# Patient Record
Sex: FEMALE | Race: WHITE | NOT HISPANIC OR LATINO
[De-identification: names, ages, dates, MRNs, and addresses within clinical notes are randomized per-mention and may not be internally consistent; named-entity substitution may affect disease eponyms.]

---

## 2017-06-15 ENCOUNTER — RX RENEWAL (OUTPATIENT)
Age: 27
End: 2017-06-15

## 2017-11-30 ENCOUNTER — RX RENEWAL (OUTPATIENT)
Age: 27
End: 2017-11-30

## 2023-03-31 ENCOUNTER — OUTPATIENT (OUTPATIENT)
Dept: OUTPATIENT SERVICES | Facility: HOSPITAL | Age: 33
LOS: 1 days | End: 2023-03-31
Payer: COMMERCIAL

## 2023-03-31 VITALS
TEMPERATURE: 98 F | DIASTOLIC BLOOD PRESSURE: 72 MMHG | WEIGHT: 145.95 LBS | HEIGHT: 65 IN | OXYGEN SATURATION: 98 % | HEART RATE: 84 BPM | RESPIRATION RATE: 16 BRPM | SYSTOLIC BLOOD PRESSURE: 110 MMHG

## 2023-03-31 DIAGNOSIS — Z01.818 ENCOUNTER FOR OTHER PREPROCEDURAL EXAMINATION: ICD-10-CM

## 2023-03-31 DIAGNOSIS — J34.89 OTHER SPECIFIED DISORDERS OF NOSE AND NASAL SINUSES: ICD-10-CM

## 2023-03-31 DIAGNOSIS — J34.2 DEVIATED NASAL SEPTUM: ICD-10-CM

## 2023-03-31 DIAGNOSIS — K08.409 PARTIAL LOSS OF TEETH, UNSPECIFIED CAUSE, UNSPECIFIED CLASS: Chronic | ICD-10-CM

## 2023-03-31 DIAGNOSIS — J34.3 HYPERTROPHY OF NASAL TURBINATES: ICD-10-CM

## 2023-03-31 DIAGNOSIS — J98.8 OTHER SPECIFIED RESPIRATORY DISORDERS: ICD-10-CM

## 2023-03-31 LAB
HCG SERPL-ACNC: <1 MIU/ML — SIGNIFICANT CHANGE UP
HCT VFR BLD CALC: 39.1 % — SIGNIFICANT CHANGE UP (ref 34.5–45)
HGB BLD-MCNC: 13 G/DL — SIGNIFICANT CHANGE UP (ref 11.5–15.5)
MCHC RBC-ENTMCNC: 30.8 PG — SIGNIFICANT CHANGE UP (ref 27–34)
MCHC RBC-ENTMCNC: 33.2 GM/DL — SIGNIFICANT CHANGE UP (ref 32–36)
MCV RBC AUTO: 92.7 FL — SIGNIFICANT CHANGE UP (ref 80–100)
NRBC # BLD: 0 /100 WBCS — SIGNIFICANT CHANGE UP (ref 0–0)
PLATELET # BLD AUTO: 228 K/UL — SIGNIFICANT CHANGE UP (ref 150–400)
RBC # BLD: 4.22 M/UL — SIGNIFICANT CHANGE UP (ref 3.8–5.2)
RBC # FLD: 12.6 % — SIGNIFICANT CHANGE UP (ref 10.3–14.5)
WBC # BLD: 6.25 K/UL — SIGNIFICANT CHANGE UP (ref 3.8–10.5)
WBC # FLD AUTO: 6.25 K/UL — SIGNIFICANT CHANGE UP (ref 3.8–10.5)

## 2023-03-31 PROCEDURE — 85027 COMPLETE CBC AUTOMATED: CPT

## 2023-03-31 PROCEDURE — G0463: CPT

## 2023-03-31 PROCEDURE — 36415 COLL VENOUS BLD VENIPUNCTURE: CPT

## 2023-03-31 PROCEDURE — 84702 CHORIONIC GONADOTROPIN TEST: CPT

## 2023-03-31 NOTE — H&P PST ADULT - PROBLEM SELECTOR PLAN 3
Labs CBC & HCG   No Medical clearance necessary  Pre op instructions reviewed and given.   Take routine am meds with sip of water.  Instructed to hold and/or avoid other NSAIDs and OTC supplements. Tylenol is ok. Verbalized understanding

## 2023-03-31 NOTE — H&P PST ADULT - HISTORY OF PRESENT ILLNESS
33 y/o female with PMH HLD, Anxiety, & Deviated Septum for PST having Septoplasty-Bilateral Turbinoplasty- Repair - Nasal Vestibular Stenosis- Kiana Bullosa Resection by Dr. Cardona on 4/4/2023. Reports having post nasal drip and  difficulty breathing through both nostrils which is worse when trying to exercise.

## 2023-03-31 NOTE — H&P PST ADULT - NSICDXFAMILYHX_GEN_ALL_CORE_FT
FAMILY HISTORY:  Father  Still living? Yes, Estimated age: Age Unknown  Family history of dyslipidemia, Age at diagnosis: Age Unknown  FH: hypertension, Age at diagnosis: Age Unknown    Mother  Still living? Unknown  Family history of dyslipidemia, Age at diagnosis: Age Unknown  FH: hypertension, Age at diagnosis: Age Unknown    Grandparent  Still living? No  Family history of stroke, Age at diagnosis: Age Unknown  FH: lung cancer, Age at diagnosis: Age Unknown  FH: myocardial infarction, Age at diagnosis: Age Unknown  FH: type 2 diabetes, Age at diagnosis: Age Unknown  FHx: melanoma, Age at diagnosis: Age Unknown    Uncle  Still living? Unknown  FH: pancreatic cancer, Age at diagnosis: Age Unknown

## 2023-04-02 RX ORDER — SODIUM CHLORIDE 9 MG/ML
1000 INJECTION, SOLUTION INTRAVENOUS
Refills: 0 | Status: DISCONTINUED | OUTPATIENT
Start: 2023-04-04 | End: 2023-04-18

## 2023-04-03 ENCOUNTER — TRANSCRIPTION ENCOUNTER (OUTPATIENT)
Age: 33
End: 2023-04-03

## 2023-04-03 NOTE — ASU PATIENT PROFILE, ADULT - FALL HARM RISK - UNIVERSAL INTERVENTIONS
Bed in lowest position, wheels locked, appropriate side rails in place/Call bell, personal items and telephone in reach/Instruct patient to call for assistance before getting out of bed or chair/Non-slip footwear when patient is out of bed/[place] to call system/Physically safe environment - no spills, clutter or unnecessary equipment/Purposeful Proactive Rounding/Room/bathroom lighting operational, light cord in reach

## 2023-04-04 ENCOUNTER — TRANSCRIPTION ENCOUNTER (OUTPATIENT)
Age: 33
End: 2023-04-04

## 2023-04-04 ENCOUNTER — OUTPATIENT (OUTPATIENT)
Dept: OUTPATIENT SERVICES | Facility: HOSPITAL | Age: 33
LOS: 1 days | End: 2023-04-04
Payer: COMMERCIAL

## 2023-04-04 VITALS
OXYGEN SATURATION: 98 % | SYSTOLIC BLOOD PRESSURE: 104 MMHG | RESPIRATION RATE: 15 BRPM | DIASTOLIC BLOOD PRESSURE: 78 MMHG | HEIGHT: 65 IN | WEIGHT: 145.95 LBS | TEMPERATURE: 98 F | HEART RATE: 64 BPM

## 2023-04-04 VITALS
RESPIRATION RATE: 13 BRPM | TEMPERATURE: 99 F | DIASTOLIC BLOOD PRESSURE: 71 MMHG | HEART RATE: 62 BPM | SYSTOLIC BLOOD PRESSURE: 112 MMHG | OXYGEN SATURATION: 99 %

## 2023-04-04 DIAGNOSIS — Z01.818 ENCOUNTER FOR OTHER PREPROCEDURAL EXAMINATION: ICD-10-CM

## 2023-04-04 DIAGNOSIS — J34.3 HYPERTROPHY OF NASAL TURBINATES: ICD-10-CM

## 2023-04-04 DIAGNOSIS — J34.89 OTHER SPECIFIED DISORDERS OF NOSE AND NASAL SINUSES: ICD-10-CM

## 2023-04-04 DIAGNOSIS — J34.2 DEVIATED NASAL SEPTUM: ICD-10-CM

## 2023-04-04 DIAGNOSIS — J98.8 OTHER SPECIFIED RESPIRATORY DISORDERS: ICD-10-CM

## 2023-04-04 DIAGNOSIS — K08.409 PARTIAL LOSS OF TEETH, UNSPECIFIED CAUSE, UNSPECIFIED CLASS: Chronic | ICD-10-CM

## 2023-04-04 PROCEDURE — 88300 SURGICAL PATH GROSS: CPT

## 2023-04-04 PROCEDURE — C1889: CPT

## 2023-04-04 PROCEDURE — 31240 NSL/SNS NDSC CNCH BULL RESCJ: CPT | Mod: 50

## 2023-04-04 PROCEDURE — 30520 REPAIR OF NASAL SEPTUM: CPT

## 2023-04-04 PROCEDURE — 88300 SURGICAL PATH GROSS: CPT | Mod: 26

## 2023-04-04 PROCEDURE — 30465 REPAIR NASAL STENOSIS: CPT

## 2023-04-04 PROCEDURE — 30802 ABLATE INF TURBINATE SUBMUC: CPT

## 2023-04-04 DEVICE — SURGICEL 2 X 14": Type: IMPLANTABLE DEVICE | Status: FUNCTIONAL

## 2023-04-04 DEVICE — SHEET SILICONE 5X5CMX.5MM: Type: IMPLANTABLE DEVICE | Status: FUNCTIONAL

## 2023-04-04 DEVICE — PACKING NASAL MEROGEL 4X4CM: Type: IMPLANTABLE DEVICE | Status: FUNCTIONAL

## 2023-04-04 RX ORDER — SODIUM CHLORIDE 9 MG/ML
1000 INJECTION, SOLUTION INTRAVENOUS
Refills: 0 | Status: DISCONTINUED | OUTPATIENT
Start: 2023-04-04 | End: 2023-04-04

## 2023-04-04 RX ORDER — ONDANSETRON 8 MG/1
4 TABLET, FILM COATED ORAL ONCE
Refills: 0 | Status: DISCONTINUED | OUTPATIENT
Start: 2023-04-04 | End: 2023-04-04

## 2023-04-04 RX ORDER — ESCITALOPRAM OXALATE 10 MG/1
1 TABLET, FILM COATED ORAL
Refills: 0 | DISCHARGE

## 2023-04-04 RX ORDER — HYDROMORPHONE HYDROCHLORIDE 2 MG/ML
0.5 INJECTION INTRAMUSCULAR; INTRAVENOUS; SUBCUTANEOUS
Refills: 0 | Status: DISCONTINUED | OUTPATIENT
Start: 2023-04-04 | End: 2023-04-04

## 2023-04-04 RX ADMIN — SODIUM CHLORIDE 75 MILLILITER(S): 9 INJECTION, SOLUTION INTRAVENOUS at 12:52

## 2023-04-04 NOTE — ASU DISCHARGE PLAN (ADULT/PEDIATRIC) - CARE PROVIDER_API CALL
Ryland Cardona)  Facial Plastic and Reconstructive Surgery; Otolaryngology  [address] [place] [address]  Phone: [phone]  Fax: [phone]  Follow Up Time:

## 2023-04-04 NOTE — ASU DISCHARGE PLAN (ADULT/PEDIATRIC) - NS MD DC FALL RISK RISK
For information on Fall & Injury Prevention, visit: [email or web address] OR  [email or web address] OR  https://www.cdc.gov/steadi/patient.html

## 2023-04-04 NOTE — BRIEF OPERATIVE NOTE - NSICDXBRIEFPOSTOP_GEN_ALL_CORE_FT
POST-OP DIAGNOSIS:  Deviated septum 04-Apr-2023 12:50:04  Ryland Cardona  Nasal valve stenosis 04-Apr-2023 12:50:15  Ryland Cardona  Kiana bullosa 04-Apr-2023 12:50:26  Ryland Cardona

## 2023-04-04 NOTE — BRIEF OPERATIVE NOTE - NSICDXBRIEFPREOP_GEN_ALL_CORE_FT
PRE-OP DIAGNOSIS:  Deviated septum 04-Apr-2023 12:48:34  Ryland Cardona  Hypertrophy, nasal, turbinate 04-Apr-2023 12:49:01  Ryland Cardona  Kiana bullosa 04-Apr-2023 12:49:09  Ryland Cardona  Nasal valve stenosis 04-Apr-2023 12:49:29  Ryland Cardona

## 2023-04-04 NOTE — BRIEF OPERATIVE NOTE - NSICDXBRIEFPROCEDURE_GEN_ALL_CORE_FT
PROCEDURES:  Septoplasty 04-Apr-2023 12:47:32  Ryland Cardona  Turbinoplasty, with submucous resection 04-Apr-2023 12:47:44  Ryland Cardona  Reconstruction, nasal valve 04-Apr-2023 12:47:56  Ryland Cardona  Excision, mat bullosa 04-Apr-2023 12:48:05  Ryland Cardona

## 2023-08-22 NOTE — ASU PATIENT PROFILE, ADULT - PATIENT KNOW
[FreeTextEntry1] : I reviewed with the patient in detail the risks, benefits, and alternatives of bilateral reduction mammoplasty. I explained to her that the goals of the operation are to reduce the size of the breast mounds and to tighten the skin envelope and raise the nipple areola complex. I explained the scar burden associated with this operation and I showed her pictures. I explained the risk of bleeding, infection, delayed wound healing, residual asymmetry, decreased sensation to the nipple areola complex, loss of the nipple areola complex, suboptimal scarring and need for revision surgery. The surgery is approximately 3.5 hours and the recovery is approximately 2 weeks - avoiding any strenuous physical activity with 1-2 weeks out of work.
yes

## 2024-04-03 NOTE — ASU DISCHARGE PLAN (ADULT/PEDIATRIC) - DO NOT DRIVE IF TAKING PAIN MEDICATION
----- Message from Wali Soliman MD sent at 4/3/2024  7:14 AM CDT -----  Regarding: April 24 appt  She was due in Jan for an MRI vs US.  She is due for her next MGM in June with Dr. Lizama.  Unless she is having issues - she will not get much benefit from imaging now.    Does she want to just have her MGM in June with Dr. Lizama or does she want to come in to see me for an MRI vs US.      Please let me know.     NULL

## (undated) DEVICE — SOL IRR POUR H2O 1000ML

## (undated) DEVICE — SUT MONOSOF 3-0 18" C-14

## (undated) DEVICE — NDL HYPO SAFE 18G X 1.5" (PINK)

## (undated) DEVICE — GLV 7.5 PROTEXIS (WHITE)

## (undated) DEVICE — ELCTR BOVIE PENCIL HANDPIECE

## (undated) DEVICE — BLADE SCALPEL SAFETYLOCK #15

## (undated) DEVICE — DRAPE INSTRUMENT POUCH 6.75" X 11"

## (undated) DEVICE — DRSG KLING 2"

## (undated) DEVICE — SYR LUER LOK 20CC

## (undated) DEVICE — VENODYNE/SCD SLEEVE CALF LARGE

## (undated) DEVICE — VENODYNE/SCD SLEEVE CALF MEDIUM

## (undated) DEVICE — DRSG AQUAPLAST TIE DOWN 6X9" IVORY 8 SHEETS

## (undated) DEVICE — SYR LUER LOK 10CC

## (undated) DEVICE — SOL IRR POUR NS 0.9% 1000ML

## (undated) DEVICE — ELCTR BOVIE TIP BLADE INSULATED 2.75" EDGE

## (undated) DEVICE — NDL HYPO REGULAR BEVEL 27G X 1.25" (GRAY)

## (undated) DEVICE — PROTECTOR HEEL / ELBOW FLUFFY

## (undated) DEVICE — DRSG MASTISOL

## (undated) DEVICE — APPLICATOR Q TIP 6" WOOD STEM

## (undated) DEVICE — SPECIMEN CONTAINER 4OZ

## (undated) DEVICE — SUT CHROMIC 4-0 18" P-3

## (undated) DEVICE — PLV-SCD MACHINE: Type: DURABLE MEDICAL EQUIPMENT

## (undated) DEVICE — SUT PLAIN GUT FAST ABSORBING 5-0 PC-1

## (undated) DEVICE — BLADE SCALPEL SAFETYLOCK #11

## (undated) DEVICE — SUT POLYSORB 5-0 18" P-13 UNDYED

## (undated) DEVICE — Device

## (undated) DEVICE — WARMING BLANKET LOWER ADULT

## (undated) DEVICE — SOL ANTI FOG

## (undated) DEVICE — SUCTION YANKAUER TAPERED BULBOUS NO VENT

## (undated) DEVICE — SUT MONOSOF 6-0 18" P-10 UNDYED

## (undated) DEVICE — DRSG STERISTRIPS 0.5 X 4"

## (undated) DEVICE — SUT POLYSORB 4-0 18" P-13 UNDYED

## (undated) DEVICE — DRAPE TOWEL BLUE 17" X 24"

## (undated) DEVICE — PLV/PSP-ESU T7E14761DX: Type: DURABLE MEDICAL EQUIPMENT

## (undated) DEVICE — PACK NASAL